# Patient Record
Sex: MALE
[De-identification: names, ages, dates, MRNs, and addresses within clinical notes are randomized per-mention and may not be internally consistent; named-entity substitution may affect disease eponyms.]

---

## 2017-11-17 ENCOUNTER — RX RENEWAL (OUTPATIENT)
Age: 57
End: 2017-11-17

## 2017-11-17 PROBLEM — Z00.00 ENCOUNTER FOR PREVENTIVE HEALTH EXAMINATION: Status: ACTIVE | Noted: 2017-11-17

## 2017-12-13 ENCOUNTER — RX RENEWAL (OUTPATIENT)
Age: 57
End: 2017-12-13

## 2018-01-11 DIAGNOSIS — Z82.69 FAMILY HISTORY OF OTHER DISEASES OF THE MUSCULOSKELETAL SYSTEM AND CONNECTIVE TISSUE: ICD-10-CM

## 2018-01-13 ENCOUNTER — APPOINTMENT (OUTPATIENT)
Dept: FAMILY MEDICINE | Facility: CLINIC | Age: 58
End: 2018-01-13

## 2018-05-10 ENCOUNTER — RX RENEWAL (OUTPATIENT)
Age: 58
End: 2018-05-10

## 2018-06-20 ENCOUNTER — APPOINTMENT (OUTPATIENT)
Dept: FAMILY MEDICINE | Facility: CLINIC | Age: 58
End: 2018-06-20
Payer: COMMERCIAL

## 2018-06-20 VITALS — SYSTOLIC BLOOD PRESSURE: 136 MMHG | RESPIRATION RATE: 12 BRPM | HEART RATE: 88 BPM | DIASTOLIC BLOOD PRESSURE: 82 MMHG

## 2018-06-20 VITALS — BODY MASS INDEX: 41.52 KG/M2 | WEIGHT: 290 LBS | HEIGHT: 70 IN

## 2018-06-20 DIAGNOSIS — Z12.11 ENCOUNTER FOR SCREENING FOR MALIGNANT NEOPLASM OF COLON: ICD-10-CM

## 2018-06-20 PROCEDURE — 99213 OFFICE O/P EST LOW 20 MIN: CPT

## 2018-06-20 NOTE — PHYSICAL EXAM
[No Acute Distress] : no acute distress [Well Nourished] : well nourished [Well Developed] : well developed [Well-Appearing] : well-appearing [Normal Voice/Communication] : normal voice/communication [Normal Gait] : normal gait [Speech Grossly Normal] : speech grossly normal [Memory Grossly Normal] : memory grossly normal [Normal Affect] : the affect was normal [Normal Mood] : the mood was normal [Normal Insight/Judgement] : insight and judgment were intact [de-identified] : scab on right shoulder without any bullseye rash, erythema and mils swelling of left 5th finger adjacent to nail, several pink and raised lesions on ulnar side of left hand and ulnar side of right hand

## 2018-06-20 NOTE — ASSESSMENT
[FreeTextEntry1] : he was prescribed doxycycline and advised to soak the finger in warm salt water and to go to the ER for any worsening symptoms or for a fever, he was given the stool FIT kit

## 2018-06-20 NOTE — HISTORY OF PRESENT ILLNESS
[___ Days ago] : [unfilled] days ago [FreeTextEntry8] : He was awakened with pain in his left 5th finger 2 days ago and now has bumps on the left hand and now on the right hand.  They are a little itchy.  He denies any known injury.  He hasn't applied any ice or heat to the area.  He ran out of his lisinopril 2 weeks ago.  He was bitten by a small tick on his right shoulder 2 weeks ago.

## 2018-12-13 ENCOUNTER — RX RENEWAL (OUTPATIENT)
Age: 58
End: 2018-12-13

## 2018-12-26 ENCOUNTER — APPOINTMENT (OUTPATIENT)
Dept: FAMILY MEDICINE | Facility: CLINIC | Age: 58
End: 2018-12-26
Payer: SELF-PAY

## 2018-12-26 VITALS
DIASTOLIC BLOOD PRESSURE: 104 MMHG | BODY MASS INDEX: 39.37 KG/M2 | SYSTOLIC BLOOD PRESSURE: 140 MMHG | WEIGHT: 275 LBS | OXYGEN SATURATION: 96 % | HEIGHT: 70 IN | HEART RATE: 96 BPM | RESPIRATION RATE: 12 BRPM

## 2018-12-26 VITALS — DIASTOLIC BLOOD PRESSURE: 96 MMHG | SYSTOLIC BLOOD PRESSURE: 148 MMHG
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
## 2018-12-26 DIAGNOSIS — S40.261A INSECT BITE (NONVENOMOUS) OF RIGHT SHOULDER, INITIAL ENCOUNTER: ICD-10-CM

## 2018-12-26 DIAGNOSIS — Z13.29 ENCOUNTER FOR SCREENING FOR OTHER SUSPECTED ENDOCRINE DISORDER: ICD-10-CM

## 2018-12-26 DIAGNOSIS — Z13.0 ENCOUNTER FOR SCREENING FOR DISEASES OF THE BLOOD AND BLOOD-FORMING ORGANS AND CERTAIN DISORDERS INVOLVING THE IMMUNE MECHANISM: ICD-10-CM

## 2018-12-26 DIAGNOSIS — Z13.220 ENCOUNTER FOR SCREENING FOR LIPOID DISORDERS: ICD-10-CM

## 2018-12-26 DIAGNOSIS — R21 RASH AND OTHER NONSPECIFIC SKIN ERUPTION: ICD-10-CM

## 2018-12-26 DIAGNOSIS — Z12.5 ENCOUNTER FOR SCREENING FOR MALIGNANT NEOPLASM OF PROSTATE: ICD-10-CM

## 2018-12-26 LAB — GLUCOSE BLDC GLUCOMTR-MCNC: 149

## 2018-12-26 PROCEDURE — 90686 IIV4 VACC NO PRSV 0.5 ML IM: CPT

## 2018-12-26 PROCEDURE — 82962 GLUCOSE BLOOD TEST: CPT

## 2018-12-26 PROCEDURE — 99214 OFFICE O/P EST MOD 30 MIN: CPT | Mod: 25

## 2018-12-26 PROCEDURE — G0008: CPT

## 2018-12-26 RX ORDER — DOXYCYCLINE HYCLATE 100 MG/1
100 CAPSULE ORAL
Qty: 20 | Refills: 0 | Status: DISCONTINUED | COMMUNITY
Start: 2018-06-20 | End: 2018-12-26

## 2018-12-26 NOTE — HISTORY OF PRESENT ILLNESS
[FreeTextEntry1] : patient presents for glucose check for work [de-identified] : He has been feeling well, has been on a very low carb diet and has lost weight.  He took his BP medication late today.  He has an open sore on his chest

## 2018-12-26 NOTE — ASSESSMENT
[FreeTextEntry1] : flu vaccine was given, I renewed lisinopril, fasting labs were ordered, GI consultation for a screening colonoscopy advised, dermatology consultation advised, ophthalmology consultation for a diabetic eye exam advised, he was advised to follow up in 1 month for a BP check

## 2018-12-26 NOTE — PHYSICAL EXAM
[No Acute Distress] : no acute distress [Well Nourished] : well nourished [Well Developed] : well developed [Well-Appearing] : well-appearing [Normal Voice/Communication] : normal voice/communication [No Respiratory Distress] : no respiratory distress  [Clear to Auscultation] : lungs were clear to auscultation bilaterally [No Accessory Muscle Use] : no accessory muscle use [Normal Rate] : normal rate  [Regular Rhythm] : with a regular rhythm [Normal S1, S2] : normal S1 and S2 [No Murmur] : no murmur heard [No Carotid Bruits] : no carotid bruits [Speech Grossly Normal] : speech grossly normal [Memory Grossly Normal] : memory grossly normal [Normal Affect] : the affect was normal [Normal Mood] : the mood was normal [Normal Insight/Judgement] : insight and judgment were intact [de-identified] : ulcerated lesion on left side of chest

## 2018-12-26 NOTE — REVIEW OF SYSTEMS
[Recent Change In Weight] : ~T recent weight change [Negative] : Heme/Lymph [FreeTextEntry2] : weight loss [de-identified] : ulcerated lesion on left side of chest

## 2019-06-19 ENCOUNTER — APPOINTMENT (OUTPATIENT)
Dept: FAMILY MEDICINE | Facility: CLINIC | Age: 59
End: 2019-06-19
Payer: SELF-PAY

## 2019-06-19 VITALS
SYSTOLIC BLOOD PRESSURE: 128 MMHG | HEART RATE: 80 BPM | DIASTOLIC BLOOD PRESSURE: 80 MMHG | OXYGEN SATURATION: 98 % | HEIGHT: 70 IN | BODY MASS INDEX: 40.09 KG/M2 | RESPIRATION RATE: 12 BRPM | WEIGHT: 280 LBS

## 2019-06-19 LAB — GLUCOSE BLDC GLUCOMTR-MCNC: 168

## 2019-06-19 PROCEDURE — 99213 OFFICE O/P EST LOW 20 MIN: CPT | Mod: 25

## 2019-06-19 PROCEDURE — 82962 GLUCOSE BLOOD TEST: CPT

## 2019-06-19 RX ORDER — LISINOPRIL 10 MG/1
10 TABLET ORAL TWICE DAILY
Qty: 60 | Refills: 5 | Status: ACTIVE | COMMUNITY
Start: 2017-12-13 | End: 1900-01-01

## 2019-06-19 NOTE — PLAN
[FreeTextEntry1] : he was given a new lab rx and will call his insurance company to check on the coverage, I increased the lisinopril to 10 mg bid and renewed metformin, he will continue diabetic diet and follow up in 6 months or sooner prn

## 2019-06-19 NOTE — HISTORY OF PRESENT ILLNESS
[de-identified] : He usually takes his lisinopril at night but took one also this morning by mistake.  He ate an egg and cheese wrap earlier this morning.  He has been following a high protein and low carb diet and did lose some weight.  He doesn't have good health insurance and is refusing to schedule a colonoscopy and doesn't want to receive any pneumococcal vaccines and isn't sure he can afford to go for any labwork.  He is just getting over a cold and his ears are clogged [FreeTextEntry1] : Pt presents for BP check

## 2019-06-19 NOTE — PHYSICAL EXAM
[Well Nourished] : well nourished [No Acute Distress] : no acute distress [Normal Voice/Communication] : normal voice/communication [Well Developed] : well developed [Well-Appearing] : well-appearing [Normal Outer Ear/Nose] : the outer ears and nose were normal in appearance [No Respiratory Distress] : no respiratory distress  [Normal Rate] : normal rate  [No Accessory Muscle Use] : no accessory muscle use [Clear to Auscultation] : lungs were clear to auscultation bilaterally [Regular Rhythm] : with a regular rhythm [Normal S1, S2] : normal S1 and S2 [No Carotid Bruits] : no carotid bruits [Speech Grossly Normal] : speech grossly normal [Normal Mood] : the mood was normal [Memory Grossly Normal] : memory grossly normal [Normal Insight/Judgement] : insight and judgment were intact [de-identified] : TM's dull, decreased light reflexes

## 2019-12-10 ENCOUNTER — APPOINTMENT (OUTPATIENT)
Dept: FAMILY MEDICINE | Facility: CLINIC | Age: 59
End: 2019-12-10
Payer: SELF-PAY

## 2019-12-10 VITALS
HEART RATE: 84 BPM | SYSTOLIC BLOOD PRESSURE: 124 MMHG | RESPIRATION RATE: 14 BRPM | OXYGEN SATURATION: 98 % | WEIGHT: 280 LBS | HEIGHT: 70 IN | DIASTOLIC BLOOD PRESSURE: 90 MMHG | BODY MASS INDEX: 40.09 KG/M2

## 2019-12-10 VITALS — SYSTOLIC BLOOD PRESSURE: 156 MMHG | DIASTOLIC BLOOD PRESSURE: 98 MMHG

## 2019-12-10 VITALS — SYSTOLIC BLOOD PRESSURE: 154 MMHG | DIASTOLIC BLOOD PRESSURE: 90 MMHG

## 2019-12-10 DIAGNOSIS — E11.9 TYPE 2 DIABETES MELLITUS W/OUT COMPLICATIONS: ICD-10-CM

## 2019-12-10 DIAGNOSIS — H93.19 TINNITUS, UNSPECIFIED EAR: ICD-10-CM

## 2019-12-10 DIAGNOSIS — I10 ESSENTIAL (PRIMARY) HYPERTENSION: ICD-10-CM

## 2019-12-10 DIAGNOSIS — L98.9 DISORDER OF THE SKIN AND SUBCUTANEOUS TISSUE, UNSPECIFIED: ICD-10-CM

## 2019-12-10 LAB — GLUCOSE BLDC GLUCOMTR-MCNC: 200

## 2019-12-10 PROCEDURE — 82962 GLUCOSE BLOOD TEST: CPT

## 2019-12-10 PROCEDURE — 99213 OFFICE O/P EST LOW 20 MIN: CPT | Mod: 25

## 2019-12-10 RX ORDER — METFORMIN HYDROCHLORIDE 500 MG/1
500 TABLET, COATED ORAL TWICE DAILY
Qty: 60 | Refills: 5 | Status: DISCONTINUED | COMMUNITY
Start: 2017-11-17 | End: 2019-12-10

## 2019-12-10 RX ORDER — METFORMIN HYDROCHLORIDE 1000 MG/1
1000 TABLET, COATED ORAL
Qty: 1 | Refills: 3 | Status: ACTIVE | COMMUNITY
Start: 2019-12-10 | End: 1900-01-01

## 2019-12-10 NOTE — REVIEW OF SYSTEMS
[Vision Problems] : no vision problems [Chest Pain] : no chest pain [Palpitations] : no palpitations [Lower Ext Edema] : no lower extremity edema [Shortness Of Breath] : no shortness of breath [Headache] : no headache

## 2019-12-10 NOTE — HISTORY OF PRESENT ILLNESS
[FreeTextEntry1] : patient presents to check blood sugar  [de-identified] : 60yo male w/ PMHx of HTN and DM II presents for bgm check. \par \par pt said he had a physical for work and he said he had microalbumin checked \par he refused a1c at this time\par \par he has been checking bgms at home 100-125mg/dL, he checks it in the morning \par bgm 190 and 200 in office \par he is taking metformin bid daily \par Denies chest pain, palpitations, shortness of breath, LE edema, headaches, vision changes\par he doesn’t have a bp machine at home to check bp\par he hasn’t seen opthalmologist or podiatrist this year

## 2019-12-10 NOTE — PLAN
[FreeTextEntry1] : \par DM II\par pt refused a1c and microalbumin\par bgm- 190 and 200 in office advised to have a1c he agreed \par bgms seem controlled at home as per pt but he said he will eat healthier \par he seems to be doing work around house to stay active\par advised to see opthalmologist, podiatrist\par increase metformin to 1000mg po bid\par refused microalbumin he said he had it this yr w/work\par \par HTN-uncontrolled- pt said he feels nervous and usually has high bp in the Dr office \par continue lisinopril 10mg po bid\par -Avoid salt\par -monitor BP at home/ or pharmacy and advised to bring readings to office next visit\par repeat bp check 2 weeks, he said he needs more time to return advised to come in within 3 weeks maximum\par if headaches/ vision changes, LE edema/chest pain or sob go to ED he understood and agreed w/plan\par filled out work form for pt \par \par needs annual derm body check and has skin lesion on right back\par also ent referral given for chronic tinnitus he said he has had his " entire life"\par \par f/u 2-3 weeks pt agreed w/plan

## 2019-12-10 NOTE — PHYSICAL EXAM
[No Lymphadenopathy] : no lymphadenopathy [Normal] : normal rate, regular rhythm, normal S1 and S2 and no murmur heard [No Edema] : there was no peripheral edema [de-identified] : normal dorsalis pedis and posterior tibialis pulses 2+ b/l, rigth great toe with thickening, scaling lesion on right lower back